# Patient Record
Sex: MALE | Race: BLACK OR AFRICAN AMERICAN
[De-identification: names, ages, dates, MRNs, and addresses within clinical notes are randomized per-mention and may not be internally consistent; named-entity substitution may affect disease eponyms.]

---

## 2020-10-20 ENCOUNTER — HOSPITAL ENCOUNTER (OUTPATIENT)
Dept: HOSPITAL 53 - M RAD | Age: 60
End: 2020-10-20
Attending: SURGERY
Payer: COMMERCIAL

## 2020-10-20 DIAGNOSIS — M17.11: Primary | ICD-10-CM

## 2020-10-20 NOTE — REP
INDICATION:

RIGHT KNEE PAIN***INMATE***



COMPARISON:

None.



TECHNIQUE:

AP, lateral, bilateral oblique and sunrise views.



FINDINGS:

Generalized age-related changes are appreciated including increased sclerosis to the

tibial plateau.  No obvious acute fracture or dislocation.  No effusion.



IMPRESSION:

Generalized age-related changes.  No acute fracture or effusion.



<Electronically signed by Byron Estrada > 10/20/20 1002